# Patient Record
Sex: MALE | Race: WHITE | Employment: OTHER | URBAN - METROPOLITAN AREA
[De-identification: names, ages, dates, MRNs, and addresses within clinical notes are randomized per-mention and may not be internally consistent; named-entity substitution may affect disease eponyms.]

---

## 2020-09-14 ENCOUNTER — NURSE ONLY (OUTPATIENT)
Dept: PRIMARY CARE CLINIC | Age: 85
End: 2020-09-14
Payer: MEDICARE

## 2020-09-14 PROCEDURE — 99211 OFF/OP EST MAY X REQ PHY/QHP: CPT | Performed by: NURSE PRACTITIONER

## 2020-09-16 LAB — SARS-COV-2, NAA: NOT DETECTED

## 2020-09-17 ENCOUNTER — ANESTHESIA EVENT (OUTPATIENT)
Dept: ENDOSCOPY | Age: 85
End: 2020-09-17
Payer: MEDICARE

## 2020-09-18 ENCOUNTER — ANESTHESIA (OUTPATIENT)
Dept: ENDOSCOPY | Age: 85
End: 2020-09-18
Payer: MEDICARE

## 2020-09-18 ENCOUNTER — HOSPITAL ENCOUNTER (OUTPATIENT)
Age: 85
Setting detail: OUTPATIENT SURGERY
Discharge: HOME | End: 2020-09-18
Attending: INTERNAL MEDICINE | Admitting: INTERNAL MEDICINE
Payer: MEDICARE

## 2020-09-18 VITALS
SYSTOLIC BLOOD PRESSURE: 150 MMHG | HEART RATE: 58 BPM | DIASTOLIC BLOOD PRESSURE: 49 MMHG | HEIGHT: 69 IN | WEIGHT: 202 LBS | BODY MASS INDEX: 29.92 KG/M2 | TEMPERATURE: 97 F | RESPIRATION RATE: 18 BRPM | OXYGEN SATURATION: 97 %

## 2020-09-18 VITALS
SYSTOLIC BLOOD PRESSURE: 146 MMHG | OXYGEN SATURATION: 98 % | RESPIRATION RATE: 1 BRPM | DIASTOLIC BLOOD PRESSURE: 67 MMHG

## 2020-09-18 LAB
GLUCOSE BLD-MCNC: 131 MG/DL (ref 70–99)
PERFORMED ON: ABNORMAL

## 2020-09-18 PROCEDURE — 3700000001 HC ADD 15 MINUTES (ANESTHESIA): Performed by: INTERNAL MEDICINE

## 2020-09-18 PROCEDURE — 2580000003 HC RX 258: Performed by: ANESTHESIOLOGY

## 2020-09-18 PROCEDURE — 3609027000 HC COLONOSCOPY: Performed by: INTERNAL MEDICINE

## 2020-09-18 PROCEDURE — 7100000011 HC PHASE II RECOVERY - ADDTL 15 MIN: Performed by: INTERNAL MEDICINE

## 2020-09-18 PROCEDURE — 7100000010 HC PHASE II RECOVERY - FIRST 15 MIN: Performed by: INTERNAL MEDICINE

## 2020-09-18 PROCEDURE — 2500000003 HC RX 250 WO HCPCS: Performed by: NURSE ANESTHETIST, CERTIFIED REGISTERED

## 2020-09-18 PROCEDURE — 3700000000 HC ANESTHESIA ATTENDED CARE: Performed by: INTERNAL MEDICINE

## 2020-09-18 PROCEDURE — 6360000002 HC RX W HCPCS: Performed by: NURSE ANESTHETIST, CERTIFIED REGISTERED

## 2020-09-18 RX ORDER — ZOLPIDEM TARTRATE 10 MG/1
1 TABLET ORAL NIGHTLY
COMMUNITY

## 2020-09-18 RX ORDER — DIPHENHYDRAMINE HYDROCHLORIDE 50 MG/ML
12.5 INJECTION INTRAMUSCULAR; INTRAVENOUS
Status: DISCONTINUED | OUTPATIENT
Start: 2020-09-18 | End: 2020-09-18 | Stop reason: HOSPADM

## 2020-09-18 RX ORDER — SODIUM CHLORIDE 0.9 % (FLUSH) 0.9 %
10 SYRINGE (ML) INJECTION EVERY 12 HOURS SCHEDULED
Status: DISCONTINUED | OUTPATIENT
Start: 2020-09-18 | End: 2020-09-18 | Stop reason: HOSPADM

## 2020-09-18 RX ORDER — OXYCODONE HYDROCHLORIDE 5 MG/1
10 TABLET ORAL PRN
Status: DISCONTINUED | OUTPATIENT
Start: 2020-09-18 | End: 2020-09-18 | Stop reason: HOSPADM

## 2020-09-18 RX ORDER — UBIDECARENONE 100 MG
100 CAPSULE ORAL DAILY
COMMUNITY

## 2020-09-18 RX ORDER — CAFFEINE 200 MG
1 TABLET ORAL EVERY 4 HOURS PRN
COMMUNITY

## 2020-09-18 RX ORDER — ATORVASTATIN CALCIUM 80 MG/1
1 TABLET, FILM COATED ORAL NIGHTLY
COMMUNITY
Start: 2020-08-26

## 2020-09-18 RX ORDER — METOCLOPRAMIDE HYDROCHLORIDE 5 MG/ML
10 INJECTION INTRAMUSCULAR; INTRAVENOUS
Status: DISCONTINUED | OUTPATIENT
Start: 2020-09-18 | End: 2020-09-18 | Stop reason: HOSPADM

## 2020-09-18 RX ORDER — DOXAZOSIN MESYLATE 4 MG/1
2 TABLET ORAL NIGHTLY
COMMUNITY
Start: 2020-03-10

## 2020-09-18 RX ORDER — CILOSTAZOL 50 MG/1
1 TABLET ORAL DAILY
COMMUNITY
Start: 2020-07-09

## 2020-09-18 RX ORDER — SODIUM CHLORIDE 0.9 % (FLUSH) 0.9 %
10 SYRINGE (ML) INJECTION PRN
Status: DISCONTINUED | OUTPATIENT
Start: 2020-09-18 | End: 2020-09-18 | Stop reason: HOSPADM

## 2020-09-18 RX ORDER — DOXYCYCLINE HYCLATE 50 MG/1
324 CAPSULE, GELATIN COATED ORAL
COMMUNITY

## 2020-09-18 RX ORDER — PIOGLITAZONEHYDROCHLORIDE 15 MG/1
1 TABLET ORAL NIGHTLY
COMMUNITY
Start: 2020-07-06

## 2020-09-18 RX ORDER — OXYCODONE HYDROCHLORIDE 5 MG/1
5 TABLET ORAL PRN
Status: DISCONTINUED | OUTPATIENT
Start: 2020-09-18 | End: 2020-09-18 | Stop reason: HOSPADM

## 2020-09-18 RX ORDER — QUININE SULFATE 324 MG/1
300 CAPSULE ORAL PRN
COMMUNITY

## 2020-09-18 RX ORDER — NIFEDIPINE 30 MG/1
1 TABLET, EXTENDED RELEASE ORAL 2 TIMES DAILY
COMMUNITY
Start: 2020-08-24

## 2020-09-18 RX ORDER — MORPHINE SULFATE 4 MG/ML
1 INJECTION, SOLUTION INTRAMUSCULAR; INTRAVENOUS EVERY 5 MIN PRN
Status: DISCONTINUED | OUTPATIENT
Start: 2020-09-18 | End: 2020-09-18 | Stop reason: HOSPADM

## 2020-09-18 RX ORDER — PROPOFOL 10 MG/ML
INJECTION, EMULSION INTRAVENOUS PRN
Status: DISCONTINUED | OUTPATIENT
Start: 2020-09-18 | End: 2020-09-18 | Stop reason: SDUPTHER

## 2020-09-18 RX ORDER — LANOLIN ALCOHOL/MO/W.PET/CERES
1315 CREAM (GRAM) TOPICAL DAILY
COMMUNITY

## 2020-09-18 RX ORDER — LIDOCAINE HYDROCHLORIDE 20 MG/ML
INJECTION, SOLUTION EPIDURAL; INFILTRATION; INTRACAUDAL; PERINEURAL PRN
Status: DISCONTINUED | OUTPATIENT
Start: 2020-09-18 | End: 2020-09-18 | Stop reason: SDUPTHER

## 2020-09-18 RX ORDER — LOSARTAN POTASSIUM 100 MG/1
100 TABLET ORAL NIGHTLY
COMMUNITY
Start: 2019-09-18

## 2020-09-18 RX ORDER — PROPOFOL 10 MG/ML
INJECTION, EMULSION INTRAVENOUS CONTINUOUS PRN
Status: DISCONTINUED | OUTPATIENT
Start: 2020-09-18 | End: 2020-09-18 | Stop reason: SDUPTHER

## 2020-09-18 RX ORDER — PHENOL 1.4 %
240 AEROSOL, SPRAY (ML) MUCOUS MEMBRANE DAILY
COMMUNITY

## 2020-09-18 RX ORDER — LABETALOL 20 MG/4 ML (5 MG/ML) INTRAVENOUS SYRINGE
5 EVERY 10 MIN PRN
Status: DISCONTINUED | OUTPATIENT
Start: 2020-09-18 | End: 2020-09-18 | Stop reason: HOSPADM

## 2020-09-18 RX ORDER — LANOLIN ALCOHOL/MO/W.PET/CERES
19 CREAM (GRAM) TOPICAL DAILY
COMMUNITY

## 2020-09-18 RX ORDER — SODIUM CHLORIDE, SODIUM LACTATE, POTASSIUM CHLORIDE, CALCIUM CHLORIDE 600; 310; 30; 20 MG/100ML; MG/100ML; MG/100ML; MG/100ML
INJECTION, SOLUTION INTRAVENOUS CONTINUOUS
Status: DISCONTINUED | OUTPATIENT
Start: 2020-09-18 | End: 2020-09-18 | Stop reason: HOSPADM

## 2020-09-18 RX ORDER — LEVOTHYROXINE SODIUM 88 UG/1
1 TABLET ORAL DAILY
COMMUNITY
Start: 2020-08-24

## 2020-09-18 RX ORDER — SILICON DIOXIDE
4 POWDER (GRAM) MISCELLANEOUS DAILY
COMMUNITY

## 2020-09-18 RX ORDER — METOPROLOL TARTRATE 50 MG/1
50 TABLET, FILM COATED ORAL NIGHTLY
COMMUNITY

## 2020-09-18 RX ORDER — ZEAXANTHIN 90 %
7 POWDER (GRAM) MISCELLANEOUS DAILY
COMMUNITY

## 2020-09-18 RX ORDER — HYDRALAZINE HYDROCHLORIDE 20 MG/ML
5 INJECTION INTRAMUSCULAR; INTRAVENOUS EVERY 10 MIN PRN
Status: DISCONTINUED | OUTPATIENT
Start: 2020-09-18 | End: 2020-09-18 | Stop reason: HOSPADM

## 2020-09-18 RX ORDER — PROMETHAZINE HYDROCHLORIDE 25 MG/ML
6.25 INJECTION, SOLUTION INTRAMUSCULAR; INTRAVENOUS
Status: DISCONTINUED | OUTPATIENT
Start: 2020-09-18 | End: 2020-09-18 | Stop reason: HOSPADM

## 2020-09-18 RX ORDER — GUAIFENESIN 600 MG/1
600 TABLET, EXTENDED RELEASE ORAL DAILY
COMMUNITY

## 2020-09-18 RX ORDER — FUROSEMIDE 20 MG/1
1 TABLET ORAL DAILY
COMMUNITY
Start: 2020-08-02

## 2020-09-18 RX ORDER — SILODOSIN 8 MG/1
8 CAPSULE ORAL DAILY
COMMUNITY

## 2020-09-18 RX ORDER — BACITRACIN ZINC 500K UNIT
200 POWDER (GRAM) MISCELLANEOUS DAILY
COMMUNITY

## 2020-09-18 RX ORDER — LORAZEPAM 1 MG/1
1 TABLET ORAL PRN
COMMUNITY
Start: 2020-06-30

## 2020-09-18 RX ADMIN — GLUCAGON HYDROCHLORIDE 1 MG: KIT at 13:39

## 2020-09-18 RX ADMIN — PROPOFOL 10 MG: 10 INJECTION, EMULSION INTRAVENOUS at 13:44

## 2020-09-18 RX ADMIN — SODIUM CHLORIDE, SODIUM LACTATE, POTASSIUM CHLORIDE, AND CALCIUM CHLORIDE: 600; 310; 30; 20 INJECTION, SOLUTION INTRAVENOUS at 12:11

## 2020-09-18 RX ADMIN — PROPOFOL 15 MCG/KG/MIN: 10 INJECTION, EMULSION INTRAVENOUS at 13:38

## 2020-09-18 RX ADMIN — PROPOFOL 20 MG: 10 INJECTION, EMULSION INTRAVENOUS at 13:38

## 2020-09-18 RX ADMIN — LIDOCAINE HYDROCHLORIDE 100 MG: 20 INJECTION, SOLUTION EPIDURAL; INFILTRATION; INTRACAUDAL; PERINEURAL at 13:38

## 2020-09-18 ASSESSMENT — PULMONARY FUNCTION TESTS
PIF_VALUE: 0

## 2020-09-18 ASSESSMENT — PAIN SCALES - GENERAL: PAINLEVEL_OUTOF10: 0

## 2020-09-18 ASSESSMENT — ENCOUNTER SYMPTOMS: SHORTNESS OF BREATH: 1

## 2020-09-18 NOTE — PROGRESS NOTES
Reiterate the discharge instructions to follow up with Dr. Darryn Bull in 2 weeks. Pt denies dizzy and nausea and had apple juice earlier. No nausea reported. Free from falls and injury and walked the pt to the discharge door and to the car.

## 2020-09-18 NOTE — ANESTHESIA PRE PROCEDURE
Department of Anesthesiology  Preprocedure Note       Name:  Deana Duckworth   Age:  82 y.o.  :  1935                                          MRN:  [id number]         Date:  2020      Surgeon: Latrice Terrell):  Aruna Branham MD    Procedure: Procedure(s):  COLONOSCOPY    Medications prior to admission:   Prior to Admission medications    Medication Sig Start Date End Date Taking? Authorizing Provider   doxazosin (CARDURA) 4 MG tablet Take 2 mg by mouth nightly 3/10/20  Yes Historical Provider, MD   losartan (COZAAR) 100 MG tablet Take 100 mg by mouth nightly 19  Yes Historical Provider, MD   diphenhydrAMINE-APAP, sleep, (EXCEDRIN PM PO) Take 2 tablets by mouth nightly    Historical Provider, MD   atorvastatin (LIPITOR) 80 MG tablet Take 1 tablet by mouth nightly 20   Historical Provider, MD   cilostazol (PLETAL) 50 MG tablet Take 1 tablet by mouth daily 20   Historical Provider, MD   furosemide (LASIX) 20 MG tablet Take 1 tablet by mouth daily 20   Historical Provider, MD   guaiFENesin (MUCINEX) 600 MG extended release tablet Take 600 mg by mouth daily    Historical Provider, MD   levothyroxine (SYNTHROID) 88 MCG tablet Take 1 tablet by mouth daily 20   Historical Provider, MD   LORazepam (ATIVAN) 1 MG tablet Take 1 tablet by mouth as needed. 20   Historical Provider, MD   metoprolol tartrate (LOPRESSOR) 50 MG tablet Take 50 mg by mouth nightly    Historical Provider, MD   NIFEdipine (PROCARDIA XL) 30 MG extended release tablet Take 1 tablet by mouth 2 times daily 20   Historical Provider, MD   pioglitazone (ACTOS) 15 MG tablet Take 1 tablet by mouth nightly 20   Historical Provider, MD   silodosin (RAPAFLO) 8 MG CAPS Take 8 mg by mouth daily    Historical Provider, MD   zolpidem (AMBIEN) 10 MG tablet Take 1 tablet by mouth nightly.     Historical Provider, MD   thiamine 100 MG tablet Take 19 mg by mouth daily    Historical Provider, MD   RIBOFLAVIN PO Take 27 mg by mouth daily    Historical Provider, MD   CHOLINE PO Take 500 mg by mouth daily    Historical Provider, MD   PANTOTHENIC ACID PO Take 99 mg by mouth    Historical Provider, MD   pyridoxine (B-6) 200 MG tablet Take 322 mg by mouth daily    Historical Provider, MD   Biotin 300 MCG TABS Take 1,315 mcg by mouth daily    Historical Provider, MD   INOSITOL-5 PO Take 500 mg by mouth daily    Historical Provider, MD   FOLIC ACID PO Take 9,004 mg by mouth daily    Historical Provider, MD   Aminobenzoic Acid POWD Take 200 mg by mouth daily    Historical Provider, MD   Ascorbic Acid (VITAMIN C PO) Take 1,740 mg by mouth daily    Historical Provider, MD   RUTIN PO Take 150 mg by mouth daily    Historical Provider, MD   VITAMIN A PO Take 6,974 mg by mouth daily    Historical Provider, MD   BETA CAROTENE PO Take 12,000 Units by mouth daily    Historical Provider, MD   Cholecalciferol (VITAMIN D3 PO) Take 1 capsule by mouth daily    Historical Provider, MD   vitamin E 100 units capsule Take 200 Units by mouth daily    Historical Provider, MD   Misc Natural Products (MIXED TOCOTRIENOLS W/ DEJAH E PO) Take 100 Units by mouth daily    Historical Provider, MD   VITAMIN K, PHYTONADIONE, PO Take 82 mcg by mouth daily    Historical Provider, MD   BORON PO Take 3,300 mcg by mouth daily    Historical Provider, MD   CALCIUM CITRATE PO Take 1,913 mg by mouth daily    Historical Provider, MD   CHROMIUM PO Take 532 mcg by mouth daily    Historical Provider, MD   COPPER PO Take 1 mg by mouth daily    Historical Provider, MD   Caffeine (VIVARIN) 200 MG TABS Take 1 tablet by mouth every 4 hours as needed    Historical Provider, MD   Potassium & Sodium Phosphates (PHOSPHORUS SUPPLEMENT PO) Take 925 mg by mouth daily    Historical Provider, MD   SELENIUM PO Take 228 mcg by mouth daily    Historical Provider, MD   coenzyme Q10 100 MG CAPS capsule Take 100 mg by mouth daily    Historical Provider, MD   Omega-3 Fatty Acids (OMEGA 3 PO) Take 200 mg by mouth daily    Historical Provider, MD   Amino Acids (L-CARNITINE PO) Take 1,000 mg by mouth daily    Historical Provider, MD   CITRUS BIOFLAVONOIDS PO Take 1,300 mg by mouth daily    Historical Provider, MD   quiNINE 324 MG capsule Take 300 mg by mouth as needed    Historical Provider, MD       Current medications:    Current Facility-Administered Medications   Medication Dose Route Frequency Provider Last Rate Last Dose    sodium chloride flush 0.9 % injection 10 mL  10 mL Intravenous 2 times per day Christiano Genin, DO        sodium chloride flush 0.9 % injection 10 mL  10 mL Intravenous PRN Christiano Genin, DO        lactated ringers infusion   Intravenous Continuous Christiano Genin, DO        HYDROmorphone (DILAUDID) injection 0.25 mg  0.25 mg Intravenous Q5 Min PRN Julia Naidu MD        HYDROmorphone (DILAUDID) injection 0.5 mg  0.5 mg Intravenous Q5 Min PRN Julia Naidu MD        morphine injection 1 mg  1 mg Intravenous Q5 Min PRN Julia Naidu MD        HYDROmorphone (DILAUDID) injection 0.5 mg  0.5 mg Intravenous Q5 Min PRN Julia Naidu MD        oxyCODONE (ROXICODONE) immediate release tablet 5 mg  5 mg Oral PRN Julia Naidu MD        Or    oxyCODONE (ROXICODONE) immediate release tablet 10 mg  10 mg Oral PRN Julia Naidu MD        diphenhydrAMINE (BENADRYL) injection 12.5 mg  12.5 mg Intravenous Once PRN Julia Naidu MD        metoclopramide (REGLAN) injection 10 mg  10 mg Intravenous Once PRN Julia Naidu MD        promethazine (PHENERGAN) injection 6.25 mg  6.25 mg Intravenous Once PRN Julia Naidu MD        labetalol (NORMODYNE;TRANDATE) injection syringe 5 mg  5 mg Intravenous Q10 Min PRN Julia Naidu MD        hydrALAZINE (APRESOLINE) injection 5 mg  5 mg Intravenous Q10 Min PRN Julia Naidu MD           Allergies:     Allergies   Allergen Reactions    Tamsulosin Anaphylaxis    Acarbose      Unknown    Canagliflozin Diarrhea and Other (See Comments)    Empagliflozin Other (See Comments)     Light headedness        Fentanyl Other (See Comments)     Unknown    Metformin Other (See Comments)     Cant remember what type of reaction      Dapagliflozin Rash       Problem List:  There is no problem list on file for this patient. Past Medical History:        Diagnosis Date    Acquired hypothyroidism     Acute urinary retention     BPH (benign prostatic hyperplasia)     COPD (chronic obstructive pulmonary disease) (HCC)     De Quervain's disease (tenosynovitis)     Diabetes mellitus due to underlying condition ([facility]     Diastolic heart failure, stage c, chronic (HCC)     Dyspnea on exertion     Elevated prostate specific antigen (PSA)     Essential hypertension     Hemorrhagic cystitis     History of colon cancer     Idiopathic peripheral neuropathy     Mass of parotid gland     CRISTINA (obstructive sleep apnea)     Osteomyelitis of left foot (HCC)     PLMD (periodic limb movement disorder)     Pulmonary HTN (HCC)     Renal insufficiency        Past Surgical History:  No past surgical history on file. Social History:    Social History     Tobacco Use    Smoking status: Not on file   Substance Use Topics    Alcohol use: Not on file                                Counseling given: Not Answered      Vital Signs (Current):   Vitals:    09/18/20 1123   BP: (!) 142/62   Pulse: 57   Resp: 16   Temp: 97.2 °F (36.2 °C)   TempSrc: Temporal   SpO2: 96%   Weight: 202 lb (91.6 kg)   Height: 5' 9\" (1.753 m)                                              BP Readings from Last 3 Encounters:   09/18/20 (!) 142/62       NPO Status:                                                   Date of last liquid consumption: 09/17/20                        Date of last solid food consumption: 09/16/20    BMI:   Wt Readings from Last 3 Encounters:   09/18/20 202 lb (91.6 kg)     Body mass index is 29.83 kg/m².     CBC: No results found for: WBC, RBC, HGB, HCT, MCV, RDW, PLT    CMP: No results found for: NA, K, CL, CO2, BUN, CREATININE, GFRAA, AGRATIO, LABGLOM, GLUCOSE, PROT, CALCIUM, BILITOT, ALKPHOS, AST, ALT    POC Tests:   Recent Labs     09/18/20  1248   POCGLU 131*       Coags: No results found for: PROTIME, INR, APTT    HCG (If Applicable): No results found for: PREGTESTUR, PREGSERUM, HCG, HCGQUANT     ABGs: No results found for: PHART, PO2ART, EII9DVU, VNC4LEW, BEART, S2VPGVGL     Type & Screen (If Applicable):  No results found for: LABABO, LABRH    Drug/Infectious Status (If Applicable):  No results found for: HIV, HEPCAB    COVID-19 Screening (If Applicable):   Lab Results   Component Value Date    COVID19 NOT DETECTED 09/14/2020         Anesthesia Evaluation  Patient summary reviewed and Nursing notes reviewed no history of anesthetic complications:   Airway: Mallampati: II  TM distance: >3 FB   Neck ROM: full  Mouth opening: > = 3 FB Dental:          Pulmonary:   (+) COPD:  shortness of breath:  sleep apnea:                             Cardiovascular:    (+) hypertension:, DELGADO:,                   Neuro/Psych:   (+) neuromuscular disease:,             GI/Hepatic/Renal:             Endo/Other:    (+) DiabetesType II DM, , hypothyroidism::., .                 Abdominal:           Vascular: negative vascular ROS. Anesthesia Plan      general     ASA 1    (82-year-old male presents for colonoscopy. Plan general anesthesia with ASA standard monitors. Questions answered. Patient agreeable with anesthetic plan.  )  Induction: intravenous. Anesthetic plan and risks discussed with patient. Plan discussed with CRNA.     Attending anesthesiologist reviewed and agrees with Pre Eval content        Sienna Ziegler MD   9/18/2020

## 2020-09-18 NOTE — H&P
History and Physical / Pre-Sedation Assessment    Patient:  Ganesh Mancilla   :   1935     Intended Procedure:  colonoscopy    HPI: h/o multiple colon polyps. Had colon resection for polyp. Father with colon cancer. diarrhea    Past Medical History:  DM  HTN  CAD. S/p stent placement    Past Surgical History:  Colon resection  appendectomy    Medications:  Prior to Admission medications    Not on File       Family History:  family history is not on file. Social History: Allergies:  Patient has no allergy information on record. ROS:  twelve point system review was unremarkable except for above noted history. Nurses notes reviewed and agreed. Medications reviewed    Physical Exam:  Vital Signs: There were no vitals taken for this visit. Skin: normal  HEENT: normal  Neck: supple. No adenopathy. No thyromegaly. No JVD. Pulmonary:Normal  Cardiac:Normal  Abdomen:Normal  MS: normal  Neuro: normal  Ext: no edema. Pulses normal    Pre-Procedure Assessment / Plan:  ASA 2 - Patient with mild systemic disease with no functional limitations  Mallampati Airway Assessment:  Mallampati Class II - (soft palate, fauces & uvula are visible)  Level of Sedation Plan:Mac sedation  Post Procedure plan: Return to same level of care    I assessed the patient and find that the patient is in satisfactory condition to proceed with the planned procedure and sedation plan. I have explained the risk, benefits, and alternatives to the procedure. The patient understands and agrees to proceed.   Nghia Cuevas  9:18 AM 2020

## 2020-09-18 NOTE — ANESTHESIA POSTPROCEDURE EVALUATION
Department of Anesthesiology  Postprocedure Note    Patient: Dre Blair  MRN: [id number]  YOB: 1935  Date of evaluation: 9/18/2020  Time:  2:34 PM     Procedure Summary     Date:  09/18/20 Room / Location:  [address] [place] [address] Tamela Lazo [address] [place] [address]    Anesthesia Start:  5742 Anesthesia Stop:  8477    Procedure:  COLONOSCOPY (N/A ) Diagnosis:       Hx of colonic polyps      (Hx of colonic polyps [Z86.010])    Surgeon:  Nancy Troy MD Responsible Provider:  Serafin Brown MD    Anesthesia Type:  general ASA Status:  3          Anesthesia Type: general    Milena Phase I: Milena Score: 10    Milena Phase II: Milena Score: 10    Last vitals: Reviewed and per EMR flowsheets.        Anesthesia Post Evaluation    Patient location during evaluation: PACU  Patient participation: complete - patient participated  Level of consciousness: awake and alert  Pain score: 0  Airway patency: patent  Nausea & Vomiting: no nausea and no vomiting  Complications: no  Cardiovascular status: hemodynamically stable  Respiratory status: acceptable  Hydration status: euvolemic

## 2020-09-18 NOTE — OP NOTE
[facility] [address]                                OPERATIVE REPORT    PATIENT NAME: Manuel Blanchard                  :        1935  MED REC NO:   [id number]                          ROOM:  ACCOUNT NO:   [de-identified]                           ADMIT DATE: 2020  PROVIDER:     Yanni Spaulding MD    DATE OF PROCEDURE:  2020    SURGEON:  Yanni Spaulding MD    INDICATION FOR PROCEDURE:  An 22-year-old gentleman with history of  multiple colon polyps that required right hemicolectomy in . The  patient presented with change in bowel movements in recent years,  diarrhea-like. Family history is significant for colon cancer. DESCRIPTION OF PROCEDURE:  With the patient in the left lateral position  and after sedation as indicated by anesthesiologist, the Olympus video  colonoscope was inserted into the rectum and carefully advanced toward  the anastomosis. The anastomosis was patent. Careful inspection did  not show any sign of recurrent polyp. Mild diverticulosis noted. However, we were unable to ideally visualize the lumen due to stool  retention. Scope was then removed without complication. IMPRESSION:  1. Status post right hemicolectomy with patent anastomosis. 2.  Sub ideal preparation of the colon. 4.  Diverticulosis of the left colon. David Ascencio MD    D: 2020 14:19:57       T: 2020 14:48:05     [id number]  Job#: [id number]     Doc#: [id number]    CC: MD CODEY Frias MD

## 2020-09-18 NOTE — PROGRESS NOTES
Progress Note  Date:2020       Room:Endo Pool/NONE  Patient Name:Viraj Farias     YOB: 1935     Age:84 y.o. Subjective    Subjective:  Symptoms:  Stable. Diet:  Adequate intake. Activity level: Normal.    Pain:  He reports no pain. Review of Systems  Objective         Vitals Last 24 Hours:  TEMPERATURE:  Temp  Av.2 °F (36.2 °C)  Min: 97.2 °F (36.2 °C)  Max: 97.2 °F (36.2 °C)  RESPIRATIONS RANGE: Resp  Av  Min: 16  Max: 16  PULSE OXIMETRY RANGE: SpO2  Av %  Min: 96 %  Max: 96 %  PULSE RANGE: Pulse  Av  Min: 57  Max: 57  BLOOD PRESSURE RANGE: Systolic (42PEN), CWN:795 , Min:142 , ZHS:105   ; Diastolic (44KTU), UUT:57, Min:62, Max:62    I/O (24Hr): No intake or output data in the 24 hours ending 20 1226  Objective:  General Appearance:  Comfortable, well-appearing, in no acute distress and not in pain. Vital signs: (most recent): Blood pressure (!) 142/62, pulse 57, temperature 97.2 °F (36.2 °C), temperature source Temporal, resp. rate 16, height 5' 9\" (1.753 m), weight 202 lb (91.6 kg), SpO2 96 %. Vital signs are normal.    Output: Producing urine and producing stool. Lungs:  Normal effort. Heart: Normal rate. Abdomen: Abdomen is soft. There is no abdominal tenderness. Extremities: Normal range of motion. Neurological: Patient is alert and oriented to person, place and time. Skin:  Warm and dry. Labs/Imaging/Diagnostics    Labs:  CBC:No results for input(s): WBC, RBC, HGB, HCT, MCV, RDW, PLT in the last 72 hours. CHEMISTRIES:No results for input(s): NA, K, CL, CO2, BUN, CREATININE, GLUCOSE, PHOS, MG in the last 72 hours. Invalid input(s): CA  PT/INR:No results for input(s): PROTIME, INR in the last 72 hours. APTT:No results for input(s): APTT in the last 72 hours. LIVER PROFILE:No results for input(s): AST, ALT, BILIDIR, BILITOT, ALKPHOS in the last 72 hours.     Imaging Last 24 Hours:  No results found.  Assessment//Plan           Assessment & Plan    Electronically signed by Hawa Duvall RN on 9/18/20 at 12:26 PM EDT